# Patient Record
Sex: MALE | Race: WHITE
[De-identification: names, ages, dates, MRNs, and addresses within clinical notes are randomized per-mention and may not be internally consistent; named-entity substitution may affect disease eponyms.]

---

## 2017-03-18 NOTE — NUR
MS RN NOTES:

PER PT HE STATED HE'S  TAKING OXYCODONE 80MG AS NECESSARY FOR PAIN, ASKED PT TWICE AND HE 
SAID 80MG, PLUS HE'S GETTING HEROIN FROM THE [facility], HE SAID 1GM A DAY

## 2017-03-18 NOTE — NUR
MS RN NOTES:

BILATERAL LEG AND FEET SWOLLEN, PT DENIES ANY NUMBNESS TINGLING SENSATION ON LEG PEDAL 
PULSES PALPABLE AND INTACT, PT ABLE TO MOVE AND WIGGLE TOES, WITH GOOD CAPILLARY REFILL 
NOTED

## 2017-03-18 NOTE — NUR
MSRANALY NOTES:

PT C/O 8/10PAIN ON HIS LEFT LEG,REQUESTING FOR DILAUDID,PRN DILAUDID 2MG IVP ADMINISTERED TO 
THE PT AT THIS TIME, EDUCATE PT REGARDING MEDICATION SIDE EFFECT,WILL CONTINUE TO MONITOR 
AND REASSESS

## 2017-03-18 NOTE — NUR
MS RN NOTES:

PT REQUESTING THAT ROOMMATE IS NOISY BECAUSE OF SNORING, MOVED PT TO [id number]- ALL BELONGINGS 
BROUGHT WITH THE PT UPON TRANSFER

## 2017-03-18 NOTE — NUR
MS RN NOTES:

PER PT HE DOESNT WANT TO GET ANY FLU AND PNEUMONIA VACCINE, HE STATED HE DOESNT GET SICK 
OFTEN, EDUCATE PT REGARDING RISK AND BENEFITS

## 2017-03-18 NOTE — NUR
MS RN NOTES:

PT INSISTED TO WEAR HIS OWN CLOTHING, EDUCATION PROVIDED TO THE PT. ALSO TOOK PICTURE OF 
PT'S SKIN ISSUES, PER PT REFUSED TO TAKE PICTURE OF LEFT LEG HE STATED ER MD DID I&D IN ER 
AND HE DOESNT WANT RN TO TAKE PICTURE OR EVEN CHANGE DRESSING, EDUCATE PT REGARDING RISK AND 
BENEFIT

## 2017-03-18 NOTE — NUR
MS RN INITIAL NOTES:

RECEIVED REPORT FROM JORDIN SHAW, PT ON BED, A/O X4, C/O PAIN ON HIS LEFT LEG 8/10 REQUESTING 
FOR PAIN MEDICATION. S/P I&D ON LEFT LEG IN ER, DRESSING CLEAN DRY INTACT, PT HAS AMEE 
MIDLINE PATENT AND FLUSHING WELL, ON HL. ORIENTED PT TO UNIT POLICY AND HOURLY ROUNDING. VS 
TAKEN AND RECORDED. SAFETY PRECAUTION FOR FALL INITIATED CALL LIGHT IN REACH. WILL CONTINUE 
TO MONITOR

## 2017-03-18 NOTE — NUR
MS RN NOTES:

PT C/O 7/10PAIN ON HIS LEFT LEG AND FEET, REQUESTING FOR PAIN MEDICATION, PRN NORCO 5/325 MG 
TAB PO ADMINISTERED TO THE PT AT THIS TIME.WILL CONTINUE TO MONITOR AND REASSESS

## 2017-03-19 NOTE — NUR
MS RN CLOSING NOTES:

PT ON BED,AWAKE, REMAINS A/OX4, RESPIRATION EVEN AND UNLABORED. AMEE MIDLINE REMAINS 

PATENT AND FLUSHING WELL, REFUSED TO BE CONNECTED TO IVF, EDUCATION PROVIDED TO THE PT. VS 
REMAINS STABLE, NEEDS ATTENDED. SAFETY PRECAUTIONS FOR FALL REMAINS ENGAGED, CALL LIGHT IN 
REACH, WILL ENDORSE TO DAY RN FOR DALY.

## 2017-03-19 NOTE — NUR
MS RN NOTES:

INFORMED PATIENT ABOUT MD'S DECISION TO CHANGE FREQUENCY OF DILAUDID AND TO OBTAIN PAIN 
MANAGEMENT CONSULT. PATIENT AGREE AND OKAY TO GET DILAUDID,AWAITING FOR RN CHARGE TO 
OVERRIDE MEDICATION

## 2017-03-19 NOTE — NUR
MS RN NOTES:

RECEIVED CALL FROM DR GREGORY, RELAYED ALL PT'S CONCERN,PER MD HE STATED OKAY FOR PATIENT 
TO GO AMA, ALSO CONTACTED NURSING KRUPA PORTILLO SHE STATED OKAY TO GO AMA,

## 2017-03-19 NOTE — NUR
MS RN NOTES:

CALLED SECURITY AND MADE AWARE TO CALL RN FIRST BEFORE LETTING ANY VISITOR FOR PATIENT,

## 2017-03-19 NOTE — NUR
CLONOPIN GIVEN AS ORDERED PER PT'S REQUEST FOR C/O ANXIETY AND INABILITY TO STAY STILL. WILL 
CONT TO MONITOR

## 2017-03-19 NOTE — NUR
MS RN NOTES:

PER DR GREGORY, TO CHANGE FREQUENCY OF DILAUDID TO Q3HRS, NEW ORDER WILL BE DILAUDID 2MG 
IVP Q3HRS AND CONSULT PLACE CONSULT FOR PAIN MANAGEMENT"

## 2017-03-19 NOTE — NUR
RN NOTES

VISITOR, GIRLFRIEND. IN ROOM TO VISIT PATIENT, BELONGINGS BROUGHT IN, CHECKED AND WRITTEN 
DOWN IN BELONGINGS LIST,PATIENT WANTING TO HAVE THE DOOR CLOSED EXPLAINED FOR SAFETY REASONS 
AND CONTINUED MONITORING DOOR TO REMAIN OPEN

## 2017-03-19 NOTE — NUR
PT REFUSED LOVENOX INJ. STATED THAT THIS IS FOR BED REST PT AND HE IS MOVING AROUSE

-------------------------------------------------------------------------------

Addendum: 03/19/17 at 2132 by DOT VELASQUEZ RN

-------------------------------------------------------------------------------

CONT; AROUND AND AMBULATORY. RISK VS. BENEFITS EXPLAINED TO THE PT. WILL CONT TO MONITOR

## 2017-03-19 NOTE — NUR
RN NOTE;

RECEIVED PT SITTING AT THE EDGE OF THE BED W/ FAMILY AT THE BED SIDE. BREATHING EVENLY. NO 
SOB. NO DISTRESS. SKIN WARM AND DRY TO TOUCH. L THIGH DRESSING CDI W/ REDNESS ON THE 
SURROUNDING AREA. BLE EDEMATOUS . STILL W/ PAIN AT THE AREA. NO WITHDRAWAL SYMPTOMS NOTED. 
NEEDS ATTENDED. CALL LIGHT WITHIN REACH. WILL CONT TO MONITOR

## 2017-03-19 NOTE — NUR
MS RN NOTES:

PT REQUESTED FOR HIS ANXIETY MEDICATION,PRN KLONOPIN 1MG TAB PO ADMINISTERED TO THE PT AT 
THIS TIME,EDUCATE PT REGARDING MEDICATION SIDE EFFECT. WILL CONTINUE TO MONITOR AND REASSESS

## 2017-03-19 NOTE — NUR
MS RN NOTES:

PT CALLED ASKING FOR ANOTHER PAIN MEDICATION HE STATED DILAUDID 2MG DOESNT WORK FOR HIS 
PAIN, AND HE SAID NORCO 5/325 DOESNT DO ANYTHING AT ALL, HE EVEN SAID THAT HE WILL JUST PULL 
OU HIS IV AND LEAVE AMA, GOES HOME TAKE HEROIN AND THEN JUST COME BACK IN ER SO HE COULD GET 
ANOTHER PAIN MEDICATION DILAUDID 8MG AS HE WOULD LIKE TO, HE SAID THE LAST TIME HE'S IN ER 
THEY GIVE IN DILAUDID 5MG AND DILAUDID 8MG. CONTACTED DR GREGORY AWAITING FOR CALL BACK

## 2017-03-19 NOTE — NUR
MS RN NOTES:

PT REQUESTED TO GO TO VENDING MACHINE TO BUY FOOD OFFERED SANDWICH BUT PT REFUSED,HE STATED 
HE WOULD LIKE TO CHOOSE FROM WHAT IS IN THE VENDING MACHINE. PT WENT TO THE [place] 
ASSISTED BY THALIA YEBOAH,ALSO CONTACT SECURITY,MADE AWARE THAT PT WILL GO TO [place] WITH 
CNA, AND PLEASE WATCH/OBSERVE THE PATIENT WHILE DOWNSTAIRS MAKE SURE NOBODY WILL GO IN CLOSE 
CONTACT WITH HIM, FOR SAFETY PURPOSES,JUST MAKING SURE HE WILL NOT GET ANY MEDICATION FROM 
OTHER PERSON HIS IN CONTACT WITH

## 2017-03-19 NOTE — NUR
MS RN NOTES:

TALKED TO THE PT, INFORMED THAT IF HE GO AM, HOSPITAL WILL NOT PROVIDE ANY MEDICAL RECORD,A 
[facility] WILL NOT BE LIABLE FOR WHATEVER WILL HAPPENED OUTSIDE FACILITY ONCE HE LEAVE. PT STATED 
OKAY I WILL NOT LEAVE FOR NOW, BUT CAN MY WIFE BRING MY MEDICATION SO I CAN TAKE IT, 
INFORMED PT THAT'S NOT ACCEPTABLE, PT STATED OKAY I WILL STAY FOR NOW AND WILL NOT LEAVE BUT 
I WOULD LIKE TO TALK TO THE DOCTOR. INFORMED PT I WILL INFORM MD BUT NOT PROMISING ANY 
SPECIFIC TIME MD WILL COME TO SEE HIM, THALIA OBINNA WITNESS,  INFORMED RN SUP AND RN CHARGE 
JESSICA

## 2017-03-19 NOTE — NUR
MS RN NOTES:

PT ON BED,AWAKE, REMAINS A/OX4, RESPIRATION EVEN AND UNLABORED. AMEE MIDLINE REMAINS 

PATENT AND FLUSHING WELL, REFUSES TO BE CONNECTED TO IVF AT THIS TIME, EDUCATION PROVIDED TO 
THE PT. VS REMAINS STABLE, NEEDS ATTENDED, REINFORCED NURSING EDUCATION AND USE OF 
MEDICATIONS AND MD ORDERS. SAFETY PRECAUTIONS OBSERVED, CALL LIGHT IN REACH, WILL CONTINUE 
TO MONITOR

## 2017-03-19 NOTE — NUR
MS RN NOTES:

PT C/O 10/10PAIN ON HIS LEFT LEG REQUESTING FOR PAIN MEDICATION,PRN DILAUDID 2MG IVP 
ADMINISTERED TO THE PT AT THIS TIME, WILL CONTINUE TO MONITOR AND REASSESS

## 2017-03-19 NOTE — NUR
MS RN NOTES:

BECAUSE OF PT'S STATEMENT THAT HE WILL ASK FAMILY MEMBER OR WIFE TO BRING HIS OPIOID FROM 
HOME, SECURITY AND RN KRUPA PORTILLO WERE NOTIFIED, SPECIFICALLY INFORMED TO CALL ASSIGNED RN 
FOR THE DAY OR NIGHT BEFORE SENDING ANY VISITOR FOR PT, AND MAKE SURE DO CHECK OR INVENTORY 
OF BELONGING FOR EVERY VISITOR COMING IN, THIS SPECIFIC MEASURES TO BE DONE TO ENSURE SAFETY 
OF PATIENT AND PREVENTING DOUBLE DOSING.

## 2017-03-19 NOTE — NUR
MS RN NOTES:

PT CALLED STATING DILAUDID ONLY LAST FOR AN HOUR AND PAIN WILL COMES BACK AGAIN, OFFERED 
NORCO BUT PT STATED IT DOESN'T HELP WITH HIS PAIN AT ALL, PT REQUESTING TO INCREASE THE DOSE 
OR AT LEAST CHANGE THE FREQUENCY OF DILAUDID. RN INFORMED PT THAT I WILL DISCUSS HIS CONCERN 
WITH THE DOCTOR BUT CANNOT PROMISE ANYTHING AS THE DECISION WILL STILL BE UP TO MD. PT AGREE 
AND UNDERSTAND. CONTACTED DR ANDONIAN EPIC ON CALL, AWAITING FOR CALL BACK

## 2017-03-20 NOTE — NUR
PT RECEIVED



RESTING COMFORTABLY IN BED WITH EYES CLOSED. NO S/S OR C/O PAIN OR DISTRESS NOTED. SIDE 
RAILS UP X2, CALL LIGHT LEFT WITHIN REACH. WILL CONTINUE PLAN OF CARE.

## 2017-03-20 NOTE — NUR
RN NOTE;

PT IN BED AWAKE AND ALERT, BREATHING EVENLY. NO SOB. W/ ANXIETY EPISODE ASKING TO SEE HIS 
GIRL FRIEND . ALSO ASKING TO INCREASE THE DOSE OF PAIN MEDICATION CLAIMING THAT HE RECEIVES 
A MUCH HIGHER DOSE OF DRUG/ HEROIN AT HOME . SPOKE TO THE PT AND ATTEMPTED TO CALM HIM DOWN. 
FRIEND AT THE BED SIDE A T THIS TIME. WILL CONT TO MONITOR AND WILL ENDORSE TO AM SHIFT FOR 
DALY.

## 2017-03-20 NOTE — NUR
RN NOTE;



RECEIVED PT AWAKE SITTING IN HER BED W. FAMILY AT THE BED SIDE. BREATHING EVENLY. DRESSING 
ON L THIGH IN PLACE. ON  AND OFF C/O PAIN. WILL CONT TO MONITOR

## 2017-03-20 NOTE — NUR
CHANGE OF SHIFT REPORT



PT RESTING COMFORTABLY IN BED. NO S/S OR C/O PAIN OR DISTRESS NOTED. SIDE RAILS UP X2, CALL 
LIGHT LEFT WITHIN REACH. PT KEPT CLEAN, DRY, AND COMFORTABLE. NO SIGNIFICANT CHANGES FROM 
PREVIOUS SHIFT. WILL GIVE REPORT TO NOC RN.

## 2017-03-21 NOTE — NUR
MS RN NOTES:

PT IN BED,AWAKE ALERT AND ORIENTED X4, RESPIRATION EVEN AND UNLABORED. AMEE MIDLINE REMAINS 

PATENT AND FLUSHING WELL NO REDNESS OR INFILTRATION.NURSING EDUCATION PROVIDED TO THE PT 
REGARDING PAIN MEDICATION AND ATB THERAPY. VS REMAINS STABLE, NEEDS ATTENDED. SAFETY 
PRECAUTIONS OBSERVED, CALL LIGHT IN REACH, WILL CONTINUE TO MONITOR

## 2017-03-21 NOTE — NUR
MS RN NOTES:

PT IN BED,AWAKE ALERT AND ORIENTED X4, RESPIRATION EVEN AND UNLABORED. AMEE MIDLINE AND ID 
BAND REMOVED WITH NO ASE NOTED.NURSING EDUCATION PROVIDED TO THE PT REGARDING PAIN 
MEDICATION AND ATB THERAPY AS WELL AS FOLLOW UP POST DISCHARGE, PATIENT WITH SKIN ISSUES 
REFUSES X3 STATES "YOU GUYS ALREADY TOOK ALOT OF PICTURES" EDUCATION REINFORCED. VS REMAINS 
STABLE, ALL DISCHARGE INSTRUCTIONS PROVIDED AND REVIEWED WITH PATIENT WITH NOTED VERBAL 
UNDERSTANDING NOTED.  SAFETY PRECAUTIONS OBSERVED, ASSISTED TO LOBBY BY RN, DISCHARGED IN 
STABLE CONDITION

## 2017-03-21 NOTE — NUR
RN NOTE;



PT IN BED AWAKE . BREATHING EVENLY. NO SOB. NO DISTRESS.SKIN WARM AND DRY. NO ACUTE CHANGES 
OVER THE NIGHT , W/ CONSTANT REQUEST OF PAIN MEDICATION AND EPISODES OF ANXIETY. MEDICATION 
GIVEN AS ORDERED. NEEDS ATTENDED. CALL LIGHT WITHIN REACH. WILL CONT TO MONITOR AND WILL 
ENDORSE TO AM SHIFT FOR DALY.

## 2017-03-23 NOTE — NUR
PT AMBULATORY TO ER BED 14. PRESENTS W/ BLE SWELLING. SEEN IN ER 5 DAYS AGO FOR 
SAME REASON. 10/10 PAIN.  TACHY PTA. AWAITING MD PRAJAPATI.

## 2017-03-23 NOTE — NUR
Patient discharged to home in stable condition. Written and verbal after care 
instructions given. Patient verbalizes understanding of instruction.

## 2017-06-12 ENCOUNTER — HOSPITAL ENCOUNTER (EMERGENCY)
Dept: HOSPITAL 54 - ER | Age: 27
Discharge: LEFT BEFORE DISCHARGE | End: 2017-06-12
Payer: SELF-PAY

## 2017-06-12 VITALS — SYSTOLIC BLOOD PRESSURE: 139 MMHG | DIASTOLIC BLOOD PRESSURE: 80 MMHG

## 2017-06-12 VITALS — WEIGHT: 250 LBS | HEIGHT: 73 IN | BODY MASS INDEX: 33.13 KG/M2

## 2017-06-12 DIAGNOSIS — L03.113: Primary | ICD-10-CM

## 2017-06-12 DIAGNOSIS — F17.200: ICD-10-CM

## 2017-06-12 DIAGNOSIS — L03.114: ICD-10-CM

## 2017-06-12 DIAGNOSIS — Z88.0: ICD-10-CM

## 2017-06-12 DIAGNOSIS — L03.115: ICD-10-CM

## 2017-06-12 DIAGNOSIS — F11.20: ICD-10-CM

## 2017-06-12 PROCEDURE — A4606 OXYGEN PROBE USED W OXIMETER: HCPCS

## 2017-06-12 PROCEDURE — Z7610: HCPCS

## 2017-06-12 NOTE — NUR
27 YO MALE BB SELF. PT IS ALERT X 3, C/O RT FOOT REDNESS/SWELLING/PAIN SINCE 
12NOON. PT ASSISTED TO ER BED, SKIN WARM AND DRY, RR EVEN AND UNLABORED. 
AWAITING ORDERS FROM PROVIDER, WILL CONTINUE TO MONITOR

## 2017-06-12 NOTE — NUR
Patient does not wish to proceed with medical care recommended by Dr. RILEY. 
Patient given information related to possible complications, up to and 
including death, which could occur as a result of leaving the hospital at this 
time. Patient verbalizes understanding of risks involved due to leaving against 
medical advice. Patient has signed AMA form.

## 2017-12-30 ENCOUNTER — EMERGENCY (EMERGENCY)
Facility: HOSPITAL | Age: 27
LOS: 1 days | Discharge: HOME | End: 2017-12-30
Attending: EMERGENCY MEDICINE | Admitting: EMERGENCY MEDICINE
Payer: COMMERCIAL

## 2017-12-30 VITALS
WEIGHT: 225.09 LBS | OXYGEN SATURATION: 99 % | TEMPERATURE: 100 F | HEART RATE: 118 BPM | HEIGHT: 73 IN | RESPIRATION RATE: 16 BRPM | SYSTOLIC BLOOD PRESSURE: 122 MMHG | DIASTOLIC BLOOD PRESSURE: 63 MMHG

## 2017-12-30 PROCEDURE — 99283 EMERGENCY DEPT VISIT LOW MDM: CPT | Mod: 25

## 2017-12-30 PROCEDURE — 10060 I&D ABSCESS SIMPLE/SINGLE: CPT

## 2017-12-30 NOTE — ED PROVIDER NOTE - OBJECTIVE STATEMENT
26 y/o M presents to the ED for abscess to L thigh today. Hx of drug abuse on legs. Last usage 6 months ago. Denies any recent usage of needles or illicit substances. States that the abscess and swelling onset suddenly today and he did not notice anything prior to today. Has had ingrown hairs and abscesses in the past. Notes that his legs are chronically red in color. Also mentions that he was standing on his feet the whole day. Denies fevers.  Allergy to penicillin.  Denies smoking. Pt just returned from [place] recently, where he was in rehab and living for some time

## 2017-12-30 NOTE — ED PROVIDER NOTE - SKIN, MLM
evidence of scarring from skin popping over all extremities, left lateral thigh +abscess 2.5cm fluctuant raised area, +surrounding cellulitis

## 2017-12-31 PROCEDURE — 10060 I&D ABSCESS SIMPLE/SINGLE: CPT

## 2017-12-31 PROCEDURE — 87070 CULTURE OTHR SPECIMN AEROBIC: CPT

## 2017-12-31 PROCEDURE — 87205 SMEAR GRAM STAIN: CPT

## 2017-12-31 PROCEDURE — 99283 EMERGENCY DEPT VISIT LOW MDM: CPT | Mod: 25

## 2017-12-31 RX ADMIN — Medication 300 MILLIGRAM(S): at 00:42

## 2018-01-03 DIAGNOSIS — L02.416 CUTANEOUS ABSCESS OF LEFT LOWER LIMB: ICD-10-CM

## 2018-01-06 LAB
CULTURE RESULTS: SIGNIFICANT CHANGE UP
SPECIMEN SOURCE: SIGNIFICANT CHANGE UP

## 2020-07-12 NOTE — NUR
MS RN NOTES:

PT C/O PAIN ON HIS LEFT LATERAL LEG 9/10 REQUESTING FOR DILAUDID PRN DILAUDID 2MG IVP 
ADMINISTERED TO THE PT AT THIS TIME,WILL CONTINUE TO MONITOR AND REASSESS Problem: Falls - Risk of:  Goal: Will remain free from falls  Description: Will remain free from falls  Outcome: Ongoing  Goal: Absence of physical injury  Description: Absence of physical injury  Outcome: Ongoing

## 2022-01-31 ENCOUNTER — APPOINTMENT (OUTPATIENT)
Dept: OTOLARYNGOLOGY | Facility: CLINIC | Age: 32
End: 2022-01-31
Payer: MEDICAID

## 2022-01-31 VITALS — TEMPERATURE: 95.4 F | BODY MASS INDEX: 36.45 KG/M2 | HEIGHT: 73 IN | WEIGHT: 275 LBS

## 2022-01-31 DIAGNOSIS — J30.9 ALLERGIC RHINITIS, UNSPECIFIED: ICD-10-CM

## 2022-01-31 DIAGNOSIS — J34.2 DEVIATED NASAL SEPTUM: ICD-10-CM

## 2022-01-31 DIAGNOSIS — J34.3 HYPERTROPHY OF NASAL TURBINATES: ICD-10-CM

## 2022-01-31 PROCEDURE — 99203 OFFICE O/P NEW LOW 30 MIN: CPT | Mod: 25

## 2022-01-31 PROCEDURE — 31231 NASAL ENDOSCOPY DX: CPT

## 2022-01-31 RX ORDER — PANTOPRAZOLE 40 MG/1
TABLET, DELAYED RELEASE ORAL
Refills: 0 | Status: ACTIVE | COMMUNITY

## 2022-02-01 PROBLEM — J34.2 DNS (DEVIATED NASAL SEPTUM): Status: ACTIVE | Noted: 2022-02-01

## 2022-02-01 PROBLEM — J34.3 HYPERTROPHY OF BOTH INFERIOR NASAL TURBINATES: Status: ACTIVE | Noted: 2022-02-01

## 2022-02-01 NOTE — HISTORY OF PRESENT ILLNESS
[de-identified] : Initial visit.\par Issue complaint is "deviated septum, can't breathe out of nose".\par \par He reports that this problem has been present for as long as he can remember.\par However he was committed to improving his health.\par He feels his nasal airway is clear bilaterally worse on the right and the left.\par He thinks is contributing to snoring.\par He cannot remember specific nasal trauma.\par He does not have a history of recurrent sinus infections.\par He is normal sense of smell and taste.\par \par He was evaluated by an otolaryngologist who diagnosed with a deviated septum and refer him to a "surgery" for consideration of repair.\par He was evaluated by that otolaryngologist and recommended oral steroids and Flonase.  She was hoping not to have to take those medications. \par He is here for my opinion today.

## 2022-02-01 NOTE — PROCEDURE
[FreeTextEntry6] : PROCEDURE NOTES\par \par PROCEDURE: Nasal endoscopy \par SURGEON: Dr. Ruggiero\par INDICATIONS: Assess for chronic sinusitis. \par ANESTHESIA: The patient was placed in a sitting position.  Following application of the topical anesthetic and decongestant, exam was performed with a zero degree endoscope.  The scope was passed along the right nasal floor to the nasopharynx.  It was then passed into the region of the middle meatus, middle turbinate, and sphenoethmoid region.  An identical procedure was performed on the left side.  The following findings were noted:\par \par The nasal mucosa was healthy appearing and the septum Has a maxillary crest spur for the entire length of his nasal cavity. This abuts is inferior turbinate. Bilateral for turbinate hypertrophy.. The middle meatus and sphenoethmoid recesses were clear bilaterally. The nasopharynx was normal. \par

## 2022-02-01 NOTE — ASSESSMENT
[FreeTextEntry1] : He is a poor nasal airway secondary to a deviated nasal septum and bilateral inferior turbinate hypertrophy.\par He did have little response to decongestant therapy in the office.\par We talked about that he is an ideal candidate for septoplasty inferior turbinate submucosal resection. We did talk about variable outcomes.\par I do not see a necessity to take oral steroids.\par He he will consider a trial of Flonase to be used twice daily.

## 2022-10-12 ENCOUNTER — APPOINTMENT (OUTPATIENT)
Dept: COLORECTAL SURGERY | Facility: CLINIC | Age: 32
End: 2022-10-12

## 2022-10-12 VITALS
BODY MASS INDEX: 37.77 KG/M2 | SYSTOLIC BLOOD PRESSURE: 164 MMHG | HEIGHT: 73 IN | HEART RATE: 82 BPM | DIASTOLIC BLOOD PRESSURE: 93 MMHG | RESPIRATION RATE: 14 BRPM | WEIGHT: 285 LBS | TEMPERATURE: 97.9 F

## 2022-10-12 DIAGNOSIS — Z72.89 OTHER PROBLEMS RELATED TO LIFESTYLE: ICD-10-CM

## 2022-10-12 DIAGNOSIS — Z87.19 PERSONAL HISTORY OF OTHER DISEASES OF THE DIGESTIVE SYSTEM: ICD-10-CM

## 2022-10-12 DIAGNOSIS — Z87.898 PERSONAL HISTORY OF OTHER SPECIFIED CONDITIONS: ICD-10-CM

## 2022-10-12 DIAGNOSIS — K60.2 ANAL FISSURE, UNSPECIFIED: ICD-10-CM

## 2022-10-12 DIAGNOSIS — K64.9 UNSPECIFIED HEMORRHOIDS: ICD-10-CM

## 2022-10-12 PROCEDURE — 99202 OFFICE O/P NEW SF 15 MIN: CPT

## 2022-10-12 RX ORDER — HYDROCORTISONE ACETATE 25 MG/1
25 SUPPOSITORY RECTAL
Qty: 12 | Refills: 0 | Status: ACTIVE | COMMUNITY
Start: 2022-10-12 | End: 1900-01-01

## 2022-10-12 NOTE — PHYSICAL EXAM
[Normal Breath Sounds] : Normal breath sounds [Normal Heart Sounds] : normal heart sounds [Normal Rate and Rhythm] : normal rate and rhythm [No Edema] : No edema [Alert] : alert [Oriented to Person] : oriented to person [Oriented to Place] : oriented to place [Oriented to Time] : oriented to time [Calm] : calm [de-identified] : obese +BS NT/ND [de-identified] : external examination reveals an almost completely resolved Right posterior thrombosed hemorrhoid. Posterior midline fissure.  [de-identified] : NC/AT [de-identified] : +ROM [de-identified] : intact

## 2022-10-12 NOTE — HISTORY OF PRESENT ILLNESS
[FreeTextEntry1] : 32 year old man with perianal tissue swelling, BRB on toilet tissue for past 1-2yrs. \par \par Approximately 2 months ago developed significant painful, perianal tissue swelling. Diagnosed with thrombosed hemorrhoid. Directed to [facility] ER. Advised to do sitz baths/OTC cream. One week later seen by GI MD. Given Rx for Hydrocort and LIdo creams without relief. Feels that thrombosis has improved. Presents for evaluation. Pain 5-6/10\par \par Daily Bowel movements, the frequency and caliber have improved since initiating daily fiber supplementation. \par \par

## 2022-11-23 ENCOUNTER — APPOINTMENT (OUTPATIENT)
Dept: COLORECTAL SURGERY | Facility: CLINIC | Age: 32
End: 2022-11-23

## 2022-11-23 PROCEDURE — 99212 OFFICE O/P EST SF 10 MIN: CPT

## 2022-11-28 ENCOUNTER — OUTPATIENT (OUTPATIENT)
Dept: OUTPATIENT SERVICES | Facility: HOSPITAL | Age: 32
LOS: 1 days | End: 2022-11-28
Payer: MEDICAID

## 2022-11-28 VITALS
HEART RATE: 85 BPM | RESPIRATION RATE: 15 BRPM | WEIGHT: 302.03 LBS | HEIGHT: 73 IN | DIASTOLIC BLOOD PRESSURE: 85 MMHG | OXYGEN SATURATION: 97 % | TEMPERATURE: 98 F | SYSTOLIC BLOOD PRESSURE: 139 MMHG

## 2022-11-28 DIAGNOSIS — Z01.818 ENCOUNTER FOR OTHER PREPROCEDURAL EXAMINATION: ICD-10-CM

## 2022-11-28 DIAGNOSIS — K60.2 ANAL FISSURE, UNSPECIFIED: ICD-10-CM

## 2022-11-28 DIAGNOSIS — K08.409 PARTIAL LOSS OF TEETH, UNSPECIFIED CAUSE, UNSPECIFIED CLASS: Chronic | ICD-10-CM

## 2022-11-28 LAB
ANION GAP SERPL CALC-SCNC: 14 MMOL/L — SIGNIFICANT CHANGE UP (ref 5–17)
BUN SERPL-MCNC: 16 MG/DL — SIGNIFICANT CHANGE UP (ref 7–23)
CALCIUM SERPL-MCNC: 9.8 MG/DL — SIGNIFICANT CHANGE UP (ref 8.4–10.5)
CHLORIDE SERPL-SCNC: 103 MMOL/L — SIGNIFICANT CHANGE UP (ref 96–108)
CO2 SERPL-SCNC: 21 MMOL/L — LOW (ref 22–31)
CREAT SERPL-MCNC: 0.76 MG/DL — SIGNIFICANT CHANGE UP (ref 0.5–1.3)
EGFR: 121 ML/MIN/1.73M2 — SIGNIFICANT CHANGE UP
GLUCOSE SERPL-MCNC: 155 MG/DL — HIGH (ref 70–99)
HCT VFR BLD CALC: 44.3 % — SIGNIFICANT CHANGE UP (ref 39–50)
HGB BLD-MCNC: 15.1 G/DL — SIGNIFICANT CHANGE UP (ref 13–17)
MCHC RBC-ENTMCNC: 28.8 PG — SIGNIFICANT CHANGE UP (ref 27–34)
MCHC RBC-ENTMCNC: 34.1 GM/DL — SIGNIFICANT CHANGE UP (ref 32–36)
MCV RBC AUTO: 84.4 FL — SIGNIFICANT CHANGE UP (ref 80–100)
NRBC # BLD: 0 /100 WBCS — SIGNIFICANT CHANGE UP (ref 0–0)
PLATELET # BLD AUTO: 317 K/UL — SIGNIFICANT CHANGE UP (ref 150–400)
POTASSIUM SERPL-MCNC: 3.8 MMOL/L — SIGNIFICANT CHANGE UP (ref 3.5–5.3)
POTASSIUM SERPL-SCNC: 3.8 MMOL/L — SIGNIFICANT CHANGE UP (ref 3.5–5.3)
RBC # BLD: 5.25 M/UL — SIGNIFICANT CHANGE UP (ref 4.2–5.8)
RBC # FLD: 11.9 % — SIGNIFICANT CHANGE UP (ref 10.3–14.5)
SODIUM SERPL-SCNC: 138 MMOL/L — SIGNIFICANT CHANGE UP (ref 135–145)
WBC # BLD: 5.94 K/UL — SIGNIFICANT CHANGE UP (ref 3.8–10.5)
WBC # FLD AUTO: 5.94 K/UL — SIGNIFICANT CHANGE UP (ref 3.8–10.5)

## 2022-11-28 PROCEDURE — 85027 COMPLETE CBC AUTOMATED: CPT

## 2022-11-28 PROCEDURE — 80048 BASIC METABOLIC PNL TOTAL CA: CPT

## 2022-11-28 PROCEDURE — G0463: CPT

## 2022-11-28 RX ORDER — SODIUM CHLORIDE 9 MG/ML
1000 INJECTION, SOLUTION INTRAVENOUS
Refills: 0 | Status: DISCONTINUED | OUTPATIENT
Start: 2022-12-02 | End: 2022-12-16

## 2022-11-28 RX ORDER — SODIUM CHLORIDE 9 MG/ML
3 INJECTION INTRAMUSCULAR; INTRAVENOUS; SUBCUTANEOUS EVERY 8 HOURS
Refills: 0 | Status: DISCONTINUED | OUTPATIENT
Start: 2022-12-02 | End: 2022-12-16

## 2022-11-28 NOTE — H&P PST ADULT - NSANTHOSAYNRD_GEN_A_CORE
No. HERMELINDO screening performed.  STOP BANG Legend: 0-2 = LOW Risk; 3-4 = INTERMEDIATE Risk; 5-8 = HIGH Risk pt stated he had sleep study recently and was found not to have sleep apena/No. HERMELINDO screening performed.  STOP BANG Legend: 0-2 = LOW Risk; 3-4 = INTERMEDIATE Risk; 5-8 = HIGH Risk

## 2022-11-28 NOTE — HISTORY OF PRESENT ILLNESS
[FreeTextEntry1] : 32-year-old gentleman who presents for a follow-up visit.  He has been troubled with anal pain for months.  I initially saw him approximately 4 to 6 weeks ago at which time he was found to have a small thrombosed hemorrhoid and a posterior midline fissure.\par \par The patient has tried aggressive nonoperative therapy including medicated suppositories and locally applied ointments.  His bowel movements are now soft and he is not straining.  He continues to have hours of pain after each bowel movement on a daily basis.  The fissure is controlling his life.\par \par Inspection of the anorectal area reveals a persistent posterior midline fissure.\par \par We discussed options including Botox and a sphincterotomy.  At this point he wants his best chance of having this situation remedied.  We therefore will schedule a partial lateral internal sphincterotomy in ambulatory surgery.

## 2022-11-28 NOTE — H&P PST ADULT - NSICDXPASTMEDICALHX_GEN_ALL_CORE_FT
PAST MEDICAL HISTORY:  2019 novel coronavirus disease (COVID-19) 10/2022 tested positive on home test, mild course, no hospitalization    H/O gastroesophageal reflux (GERD)     Heroin abuse last use in 2017    Obesity

## 2022-11-28 NOTE — H&P PST ADULT - HISTORY OF PRESENT ILLNESS
31 yo male. PMH GERD.   presents to [facility] scheduled for sphinecterotomy on 12/2.  covid test scheduled on 12/2 at [facility].  31 yo male. PMH GERD, obesity (BMI=39), heroine use (quitted and has been clean since 2017), c/o anal pain x months (pain upon defecation, scant amount of bright red blood noted after defecation), evaluated by Dr. Edmonds, diangosed with anal fissure, now presents to PST scheduled for sphinecterotomy on 12/2.  covid test scheduled on 12/2 at [facility].

## 2022-12-01 ENCOUNTER — TRANSCRIPTION ENCOUNTER (OUTPATIENT)
Age: 32
End: 2022-12-01

## 2022-12-02 ENCOUNTER — OUTPATIENT (OUTPATIENT)
Dept: OUTPATIENT SERVICES | Facility: HOSPITAL | Age: 32
LOS: 1 days | End: 2022-12-02
Payer: MEDICAID

## 2022-12-02 ENCOUNTER — RESULT REVIEW (OUTPATIENT)
Age: 32
End: 2022-12-02

## 2022-12-02 ENCOUNTER — TRANSCRIPTION ENCOUNTER (OUTPATIENT)
Age: 32
End: 2022-12-02

## 2022-12-02 ENCOUNTER — APPOINTMENT (OUTPATIENT)
Dept: COLORECTAL SURGERY | Facility: HOSPITAL | Age: 32
End: 2022-12-02

## 2022-12-02 VITALS
RESPIRATION RATE: 19 BRPM | DIASTOLIC BLOOD PRESSURE: 69 MMHG | OXYGEN SATURATION: 100 % | SYSTOLIC BLOOD PRESSURE: 134 MMHG | HEART RATE: 72 BPM | TEMPERATURE: 97 F

## 2022-12-02 VITALS
HEART RATE: 77 BPM | WEIGHT: 302.03 LBS | TEMPERATURE: 97 F | SYSTOLIC BLOOD PRESSURE: 141 MMHG | RESPIRATION RATE: 16 BRPM | OXYGEN SATURATION: 97 % | DIASTOLIC BLOOD PRESSURE: 86 MMHG | HEIGHT: 73 IN

## 2022-12-02 DIAGNOSIS — K08.409 PARTIAL LOSS OF TEETH, UNSPECIFIED CAUSE, UNSPECIFIED CLASS: Chronic | ICD-10-CM

## 2022-12-02 DIAGNOSIS — Z01.818 ENCOUNTER FOR OTHER PREPROCEDURAL EXAMINATION: ICD-10-CM

## 2022-12-02 DIAGNOSIS — K60.2 ANAL FISSURE, UNSPECIFIED: ICD-10-CM

## 2022-12-02 PROCEDURE — 88305 TISSUE EXAM BY PATHOLOGIST: CPT

## 2022-12-02 PROCEDURE — 46080 SPHNCTROTMY ANAL DIV SPHNCTR: CPT

## 2022-12-02 PROCEDURE — C1889: CPT

## 2022-12-02 PROCEDURE — 88305 TISSUE EXAM BY PATHOLOGIST: CPT | Mod: 26

## 2022-12-02 PROCEDURE — 46275 REMOVE ANAL FIST INTER: CPT

## 2022-12-02 DEVICE — SURGICEL FIBRILLAR 2 X 4": Type: IMPLANTABLE DEVICE | Status: FUNCTIONAL

## 2022-12-02 RX ORDER — FAMOTIDINE 10 MG/ML
1 INJECTION INTRAVENOUS
Qty: 0 | Refills: 0 | DISCHARGE

## 2022-12-02 RX ORDER — PANTOPRAZOLE SODIUM 20 MG/1
1 TABLET, DELAYED RELEASE ORAL
Qty: 0 | Refills: 0 | DISCHARGE

## 2022-12-02 RX ORDER — ONDANSETRON 8 MG/1
4 TABLET, FILM COATED ORAL ONCE
Refills: 0 | Status: DISCONTINUED | OUTPATIENT
Start: 2022-12-02 | End: 2022-12-16

## 2022-12-02 RX ORDER — FENTANYL CITRATE 50 UG/ML
25 INJECTION INTRAVENOUS
Refills: 0 | Status: DISCONTINUED | OUTPATIENT
Start: 2022-12-02 | End: 2022-12-02

## 2022-12-02 RX ORDER — LIDOCAINE HCL 20 MG/ML
0.2 VIAL (ML) INJECTION ONCE
Refills: 0 | Status: COMPLETED | OUTPATIENT
Start: 2022-12-02 | End: 2022-12-02

## 2022-12-02 RX ADMIN — SODIUM CHLORIDE 3 MILLILITER(S): 9 INJECTION INTRAMUSCULAR; INTRAVENOUS; SUBCUTANEOUS at 06:47

## 2022-12-02 RX ADMIN — SODIUM CHLORIDE 100 MILLILITER(S): 9 INJECTION, SOLUTION INTRAVENOUS at 06:47

## 2022-12-02 NOTE — PRE-ANESTHESIA EVALUATION ADULT - NSANTHOSAYNRD_GEN_A_CORE
pt stated he had sleep study recently and was found not to have sleep apena/No. HERMELINDO screening performed.  STOP BANG Legend: 0-2 = LOW Risk; 3-4 = INTERMEDIATE Risk; 5-8 = HIGH Risk

## 2022-12-02 NOTE — ASU PATIENT PROFILE, ADULT - FALL HARM RISK - UNIVERSAL INTERVENTIONS
Bed in lowest position, wheels locked, appropriate side rails in place/Call bell, personal items and telephone in reach/Instruct patient to call for assistance before getting out of bed or chair/Non-slip footwear when patient is out of bed/[place] to call system/Physically safe environment - no spills, clutter or unnecessary equipment/Purposeful Proactive Rounding/Room/bathroom lighting operational, light cord in reach

## 2022-12-05 PROBLEM — F11.10 OPIOID ABUSE, UNCOMPLICATED: Chronic | Status: ACTIVE | Noted: 2018-01-02

## 2022-12-05 PROBLEM — U07.1 COVID-19: Chronic | Status: ACTIVE | Noted: 2022-11-28

## 2022-12-05 PROBLEM — E66.9 OBESITY, UNSPECIFIED: Chronic | Status: ACTIVE | Noted: 2022-11-28

## 2022-12-05 PROBLEM — Z87.19 PERSONAL HISTORY OF OTHER DISEASES OF THE DIGESTIVE SYSTEM: Chronic | Status: ACTIVE | Noted: 2022-11-28

## 2022-12-08 LAB — SURGICAL PATHOLOGY STUDY: SIGNIFICANT CHANGE UP

## 2022-12-21 ENCOUNTER — APPOINTMENT (OUTPATIENT)
Dept: COLORECTAL SURGERY | Facility: CLINIC | Age: 32
End: 2022-12-21
Payer: MEDICAID

## 2022-12-21 PROCEDURE — 99213 OFFICE O/P EST LOW 20 MIN: CPT

## 2022-12-21 NOTE — HISTORY OF PRESENT ILLNESS
[FreeTextEntry1] : 32-year-old gentleman who presents for his first postoperative visit.\par \par Patient is approximately 3 weeks status post examination under anesthesia, debridement of a fissure and partial lateral internal sphincterotomy for chronic and very symptomatic anal fissure.\par \par Preoperatively the patient rated his pain at 10 out of 10 with bowel movements.  Currently he rates it at 0 out of 10.  He has minimal to no drainage and again no pain.  He denies rectal bleeding.  He states that he has excellent control of his bowel movements.\par \par Patient was examined in the left lateral decubitus position.  The anorectal area was effaced and both the sphincterotomy site and the posterior midline fissure are approximately 75% healed.  There is no tenderness whatsoever.  There is no evidence of infection.\par \par The patient was assured that he is making an excellent recuperation.  I will do a wound check in approximately 1 month.

## 2023-01-18 ENCOUNTER — APPOINTMENT (OUTPATIENT)
Dept: COLORECTAL SURGERY | Facility: CLINIC | Age: 33
End: 2023-01-18

## 2023-02-13 ENCOUNTER — APPOINTMENT (OUTPATIENT)
Dept: OTOLARYNGOLOGY | Facility: CLINIC | Age: 33
End: 2023-02-13
Payer: MEDICAID

## 2023-02-13 DIAGNOSIS — T48.5X5A CHRONIC RHINITIS: ICD-10-CM

## 2023-02-13 DIAGNOSIS — J34.2 DEVIATED NASAL SEPTUM: ICD-10-CM

## 2023-02-13 DIAGNOSIS — J34.3 HYPERTROPHY OF NASAL TURBINATES: ICD-10-CM

## 2023-02-13 DIAGNOSIS — J31.0 CHRONIC RHINITIS: ICD-10-CM

## 2023-02-13 PROCEDURE — 31231 NASAL ENDOSCOPY DX: CPT

## 2023-02-13 PROCEDURE — 99214 OFFICE O/P EST MOD 30 MIN: CPT | Mod: 25

## 2023-02-13 NOTE — HISTORY OF PRESENT ILLNESS
[de-identified] : Initial visit.\par Issue complaint is "deviated septum, can't breathe out of nose".\par \par He reports that this problem has been present for as long as he can remember.\par However he was committed to improving his health.\par He feels his nasal airway is clear bilaterally worse on the right and the left.\par He thinks is contributing to snoring.\par He cannot remember specific nasal trauma.\par He does not have a history of recurrent sinus infections.\par He is normal sense of smell and taste.\par \par He was evaluated by an otolaryngologist who diagnosed with a deviated septum and refer him to a "surgery" for consideration of repair.\par He was evaluated by that otolaryngologist and recommended oral steroids and Flonase.  She was hoping not to have to take those medications. \par He is here for my opinion today. [FreeTextEntry1] : \par \par 02/13/2023 \par \par Comes in in follow-up today.  Reports that he still has a poor nasal airway in spite of using Flonase for a prolonged period of time.\par He feels his right nasal airway is worse than his left.  He feels his left nasal airway is dry.  He feels that he is a mouth breather.\par He still does use Afrin at least once a week.

## 2023-02-13 NOTE — ASSESSMENT
[FreeTextEntry1] : Primarily structural pathology of his nose.\par He is a candidate for septoplasty and inferior turbinate submucosal resection.\par We talked about the risks which included but were not limited the risks of general anesthesia, bleeding, infection and septal perforation.\par We also spent time talking about the variable outcomes of this procedure.\par I furthermore explained to him that this could help with his sleep disturbed breathing but would not control his sleeping or mouth breathing primarily.

## 2024-08-01 NOTE — H&P PST ADULT - PATIENT ON (OXYGEN DELIVERY METHOD)
Lab Results   Component Value Date    SODIUM 134 (L) 08/01/2024    SODIUM 135 07/31/2024    SODIUM 134 (L) 07/30/2024    SODIUM 134 (L) 07/29/2024      Sodium back to normal range  Will monitor electrolytes   room air

## (undated) DEVICE — DRAPE THYROID 77" X 123"

## (undated) DEVICE — CATH IV SAFE INSYTE 18G X 1.88" (GREEN)

## (undated) DEVICE — GLV 7.5 PROTEXIS (WHITE)

## (undated) DEVICE — DRAPE INSTRUMENT POUCH 6.75" X 11"

## (undated) DEVICE — VENODYNE/SCD SLEEVE CALF MEDIUM

## (undated) DEVICE — SOL IRR POUR NS 0.9% 500ML

## (undated) DEVICE — PREP BETADINE SPONGE STICKS

## (undated) DEVICE — TAPE SILK 3"

## (undated) DEVICE — WARMING BLANKET UPPER ADULT

## (undated) DEVICE — NDL HYPO SAFE 30G X .5" (YELLOW)

## (undated) DEVICE — DRSG TELFA 3 X 8

## (undated) DEVICE — MEDICATION LABELS W MARKER

## (undated) DEVICE — SUT CHROMIC 3-0 30" V-20

## (undated) DEVICE — DRSG COMBINE 5X9"

## (undated) DEVICE — PACK MINOR

## (undated) DEVICE — POSITIONER STRAP ARMBOARD VELCRO TS-30

## (undated) DEVICE — SOL IRR POUR H2O 250ML

## (undated) DEVICE — VESSEL LOOP MAXI-RED  0.120" X 16"

## (undated) DEVICE — SPECIMEN CONTAINER 100ML

## (undated) DEVICE — SYR LUER LOK 10CC

## (undated) DEVICE — SUT CHROMIC 2-0 30" V-20

## (undated) DEVICE — DRSG CURITY GAUZE SPONGE 4 X 4" 12-PLY

## (undated) DEVICE — LUBRICATING JELLY ONESHOT 1.25OZ